# Patient Record
Sex: FEMALE | Race: WHITE
[De-identification: names, ages, dates, MRNs, and addresses within clinical notes are randomized per-mention and may not be internally consistent; named-entity substitution may affect disease eponyms.]

---

## 2018-10-29 ENCOUNTER — HOSPITAL ENCOUNTER (EMERGENCY)
Dept: HOSPITAL 97 - ER | Age: 63
Discharge: HOME | End: 2018-10-29
Payer: COMMERCIAL

## 2018-10-29 DIAGNOSIS — K21.0: Primary | ICD-10-CM

## 2018-10-29 DIAGNOSIS — J15.8: ICD-10-CM

## 2018-10-29 DIAGNOSIS — K30: ICD-10-CM

## 2018-10-29 DIAGNOSIS — E03.9: ICD-10-CM

## 2018-10-29 LAB
ALBUMIN SERPL BCP-MCNC: 3.7 G/DL (ref 3.4–5)
ALP SERPL-CCNC: 73 U/L (ref 45–117)
ALT SERPL W P-5'-P-CCNC: 36 U/L (ref 12–78)
AST SERPL W P-5'-P-CCNC: 26 U/L (ref 15–37)
BUN BLD-MCNC: 13 MG/DL (ref 7–18)
GLUCOSE SERPLBLD-MCNC: 111 MG/DL (ref 74–106)
HCT VFR BLD CALC: 37.3 % (ref 36–45)
INR BLD: 0.97
LIPASE SERPL-CCNC: 104 U/L (ref 73–393)
LYMPHOCYTES # SPEC AUTO: 1.1 K/UL (ref 0.7–4.9)
MAGNESIUM SERPL-MCNC: 2.4 MG/DL (ref 1.8–2.4)
MCH RBC QN AUTO: 32.5 PG (ref 27–35)
MCV RBC: 95.9 FL (ref 80–100)
NT-PROBNP SERPL-MCNC: 118 PG/ML (ref ?–125)
PMV BLD: 8.2 FL (ref 7.6–11.3)
POTASSIUM SERPL-SCNC: 4 MMOL/L (ref 3.5–5.1)
RBC # BLD: 3.89 M/UL (ref 3.86–4.86)
TROPONIN (EMERG DEPT USE ONLY): < 0.02 NG/ML (ref 0–0.04)
TSH SERPL DL<=0.05 MIU/L-ACNC: 0.04 UIU/ML (ref 0.36–3.74)

## 2018-10-29 PROCEDURE — 80048 BASIC METABOLIC PNL TOTAL CA: CPT

## 2018-10-29 PROCEDURE — 71275 CT ANGIOGRAPHY CHEST: CPT

## 2018-10-29 PROCEDURE — 71045 X-RAY EXAM CHEST 1 VIEW: CPT

## 2018-10-29 PROCEDURE — 83735 ASSAY OF MAGNESIUM: CPT

## 2018-10-29 PROCEDURE — 81003 URINALYSIS AUTO W/O SCOPE: CPT

## 2018-10-29 PROCEDURE — 87088 URINE BACTERIA CULTURE: CPT

## 2018-10-29 PROCEDURE — 80076 HEPATIC FUNCTION PANEL: CPT

## 2018-10-29 PROCEDURE — 96361 HYDRATE IV INFUSION ADD-ON: CPT

## 2018-10-29 PROCEDURE — 99285 EMERGENCY DEPT VISIT HI MDM: CPT

## 2018-10-29 PROCEDURE — 84443 ASSAY THYROID STIM HORMONE: CPT

## 2018-10-29 PROCEDURE — 83690 ASSAY OF LIPASE: CPT

## 2018-10-29 PROCEDURE — 84484 ASSAY OF TROPONIN QUANT: CPT

## 2018-10-29 PROCEDURE — 87086 URINE CULTURE/COLONY COUNT: CPT

## 2018-10-29 PROCEDURE — 85379 FIBRIN DEGRADATION QUANT: CPT

## 2018-10-29 PROCEDURE — 36415 COLL VENOUS BLD VENIPUNCTURE: CPT

## 2018-10-29 PROCEDURE — 96365 THER/PROPH/DIAG IV INF INIT: CPT

## 2018-10-29 PROCEDURE — 85025 COMPLETE CBC W/AUTO DIFF WBC: CPT

## 2018-10-29 PROCEDURE — 85610 PROTHROMBIN TIME: CPT

## 2018-10-29 PROCEDURE — 87040 BLOOD CULTURE FOR BACTERIA: CPT

## 2018-10-29 PROCEDURE — 83880 ASSAY OF NATRIURETIC PEPTIDE: CPT

## 2018-10-29 PROCEDURE — 93005 ELECTROCARDIOGRAM TRACING: CPT

## 2018-10-29 PROCEDURE — 96375 TX/PRO/DX INJ NEW DRUG ADDON: CPT

## 2018-10-29 NOTE — RAD REPORT
EXAM DESCRIPTION:  CT - Chest For Pe Angio - 10/29/2018 4:31 pm

 

CLINICAL HISTORY:  Chest pain.

CHEST PAIN

 

COMPARISON:  No comparisons

 

TECHNIQUE:  CT angiogram of the pulmonary arteries was performed with MIP.

 

All CT scans are performed using dose optimization technique as appropriate and may include automated
 exposure control or mA/KV adjustment according to patient size.

 

FINDINGS:  No evidence of pulmonary thromboembolism.

 

No acute aortic finding demonstrated.

 

Mild ground-glass opacities are present bilaterally.

 

No significant pericardial or pleural fluid.

 

No concerning bony finding. The esophagus appears thickened particularly in the mid and lower esophag
us.

 

IMPRESSION:  No evidence of pulmonary thromboembolism.

 

Diffuse esophageal thickening is present particularly in the mid in the lower esophagus may indicate 
esophagitis. Consider upper endoscopy followup assessment.

 

Bilateral mild nonspecific ground-glass opacities may be related to alveolitis or interstitial edema.

## 2018-10-29 NOTE — EKG
Test Date:    2018-10-29               Test Time:    14:46:13

Technician:   NESHA                                     

                                                     

MEASUREMENT RESULTS:                                       

Intervals:                                           

Rate:         73                                     

IN:           160                                    

QRSD:         90                                     

QT:           382                                    

QTc:          420                                    

Axis:                                                

P:            72                                     

IN:           160                                    

QRS:          59                                     

T:            47                                     

                                                     

INTERPRETIVE STATEMENTS:                                       

                                                     

Normal sinus rhythm

Normal ECG

No previous ECG available for comparison



Electronically Signed On 10-29-18 15:43:18 CDT by Alex Wing

## 2018-10-29 NOTE — RAD REPORT
EXAM DESCRIPTION:  RAD - Chest Single View - 10/29/2018 3:02 pm

 

CLINICAL HISTORY:  Chest pain

 

COMPARISON:  None.

 

TECHNIQUE:  AP portable chest image was obtained 1456 hours .

 

FINDINGS:  Lungs are clear. Heart and vasculature are normal. No measurable pleural effusion and no p
neumothorax. No acute bony abnormality seen. No acute aortic findings suspected.

 

IMPRESSION:  No acute cardiopulmonary process.

## 2018-10-29 NOTE — ER
Nurse's Notes                                                                                     

 [facility]                                                                

Name: Rudy Corea                                                                                 

Age: 63 yrs                                                                                       

Sex: Female                                                                                       

: 1955                                                                                   

MRN: [id number]                                                                                   

Arrival Date: 10/29/2018                                                                          

Time: 14:06                                                                                       

Account#: [id number]                                                                            

Bed 16                                                                                            

Private MD: None, None                                                                            

Diagnosis: Functional dyspepsia;Chest pain, unspecified;Gastro-esophageal reflux disease with     

  esophagitis;Pneumonia due to other specified bacteria                                           

                                                                                                  

Presentation:                                                                                     

10/29                                                                                             

14:12 Presenting complaint: Patient states: "I've had like this heart burn for the past 4-5   ss  

      days and increased belching. The only way I can explain it is like if you take some         

      pills and they get stuck. It hurts to swallow. It's not a heart attack.". Transition of     

      care: patient was not received from another setting of care. Onset of symptoms was          

      2018. Risk Assessment: Do you want to hurt yourself or someone else?            

      Patient reports no desire to harm self or others. Initial Sepsis Screen: Does the           

      patient meet any 2 criteria? No. Patient's initial sepsis screen is negative. Does the      

      patient have a suspected source of infection? No. Patient's initial sepsis screen is        

      negative. Care prior to arrival: None.                                                      

14:12 Method Of Arrival: Ambulatory                                                           ss  

14:12 Acuity: LAYLA 3                                                                           ss  

                                                                                                  

Triage Assessment:                                                                                

15:42 General: Appears in no apparent distress. comfortable, well groomed, well nourished,    ls4 

      Behavior is calm, cooperative. Pain: Complains of pain in xyphoid area and mid-sternal      

      area Pain currently is 5 out of 10 on a pain scale. Quality of pain is described as pt      

      describes it as a "feeling that something is stuck in my throat". Neuro: No deficits        

      noted. Cardiovascular: No deficits noted. Rhythm is regular. Respiratory: No deficits       

      noted.                                                                                      

                                                                                                  

Historical:                                                                                       

- Allergies:                                                                                      

14:15 No Known Allergies;                                                                     ss  

- Home Meds:                                                                                      

14:15 Effexor Oral 75 mg daily [Active]; [place] Thyroid Oral [Active]; hormone replacement    ss  

      [Active];                                                                                   

- PMHx:                                                                                           

14:15 Hypothyroidism;                                                                         ss  

                                                                                                  

- Immunization history:: Adult Immunizations unknown.                                             

- Social history:: Smoking status: Patient/guardian denies using tobacco.                         

- Ebola Screening: : Patient denies exposure to infectious person Patient denies travel           

  to an Ebola-affected area in the 21 days before illness onset.                                  

- Family history:: not pertinent.                                                                 

                                                                                                  

                                                                                                  

Screening:                                                                                        

15:39 Abuse screen: Denies threats or abuse. Denies injuries from another. Nutritional        ls4 

      screening: No deficits noted. Tuberculosis screening: No symptoms or risk factors           

      identified. Fall Risk None identified.                                                      

                                                                                                  

Assessment:                                                                                       

15:00 General: Appears in no apparent distress. Behavior is calm, cooperative. Pain: Pain     hb  

      currently is 0 out of 10 on a pain scale. at worst was 5 out of 10 on a pain scale.         

      Neuro: Level of Consciousness is awake, alert, obeys commands, Oriented to person,          

      place, time, situation. Cardiovascular: Capillary refill < 3 seconds Patient's skin is      

      warm and dry. Respiratory: Airway is patent Trachea midline Respiratory effort is even,     

      unlabored, Respiratory pattern is regular, symmetrical, Breath sounds are clear             

      bilaterally. GI: No signs and/or symptoms were reported involving the gastrointestinal      

      system. : No signs and/or symptoms were reported regarding the genitourinary system.      

      EENT: No signs and/or symptoms were reported regarding the EENT system. Derm: Skin is       

      intact, is healthy with good turgor, Skin is pink, warm \T\ dry.                            

15:44 Pain: Pain does not radiate. Pain began 2-3 days ago.                                   ls4 

16:15 Reassessment: Patient appears in no apparent distress at this time. No changes from     hb  

      previously documented assessment. Patient and/or family updated on plan of care and         

      expected duration. Pain level reassessed. Patient is alert, oriented x 3, equal             

      unlabored respirations, skin warm/dry/pink.                                                 

16:32 General: Appears in no apparent distress. well developed, Behavior is calm,             ls4 

      cooperative. Neuro: No deficits noted. Cardiovascular: No deficits noted. Respiratory:      

      No deficits noted. GI: GI: Reports feeling that something is stuck in her throat.           

      Musculoskeletal: No deficits noted. No signs and/or symptoms reported regarding the         

      musculoskeletal system.                                                                     

17:30 Reassessment: Patient appears in no apparent distress at this time. No changes from     hb  

      previously documented assessment. Patient and/or family updated on plan of care and         

      expected duration. Pain level reassessed. Patient is alert, oriented x 3, equal             

      unlabored respirations, skin warm/dry/pink.                                                 

                                                                                                  

Vital Signs:                                                                                      

14:15  / 94; Pulse 84; Resp 16; Temp 97.4(TE); Pulse Ox 98% on R/A; Weight 68.04 kg;    ss  

      Height 5 ft. 9 in. (175.26 cm); Pain 0/10;                                                  

16:34  / 73; Pulse 78; Resp 16; Pulse Ox 99% on R/A; Pain 0/10;                         ls4 

18:18  / 74; Pulse 81; Resp 16; Temp 98.4; Pulse Ox 99% ; Pain 0/10;                    ls4 

14:15 Body Mass Index 22.15 (68.04 kg, 175.26 cm)                                               

                                                                                                  

ED Course:                                                                                        

14:06 Patient arrived in ED.                                                                  sb2 

14:07 None, None is Private Physician.                                                        sb2 

14:13 Triage completed.                                                                       ss  

14:15 Arm band placed on right wrist.                                                         ss  

14:35 Patient has correct armband on for positive identification. Bed in low position. Call   ls4 

      light in reach. Side rails up X2. Cardiac monitor on. Pulse ox on. NIBP on.                 

14:35 Noise minimized. Lights dimmed. Warm blanket given. Verbal reassurance given.           ls4 

14:35 No provider procedures requiring assistance completed. Initial lab(s) drawn, by me,     ls4 

      sent to lab. Inserted saline lock: 20 gauge in left antecubital area, using aseptic         

      technique. Blood collected. Patient maintains SpO2 saturation greater than 95% on room      

      air.                                                                                        

14:36 Henok Merino MD is Attending Physician.                                             lay 

14:57 X-ray completed. Portable x-ray completed in exam room. Patient tolerated procedure     ls3 

      well.                                                                                       

15:01 EKG done, by EKG tech. reviewed by Henok Merino MD.                                   at1 

15:02 XRAY Chest (1 view) In Process Unspecified.                                             EDMS

16:03 Tatiana Romero, RN is Primary Nurse.                                                   hb  

16:11 Radiology exam delayed due to lab results not completed at this time. (BUN/Creatinine). cw1 

16:16 Patient moved to CT.                                                                    cw1 

16:31 CT Chest For PE Angio In Process Unspecified.                                           EDMS

17:08 Basic Metabolic Panel Sent.                                                             hb  

17:08 CBC with Diff Sent.                                                                     hb  

17:08 LFT's Sent.                                                                             hb  

17:33 Jonn Hendricks MD is Referral Physician.                                               aly 

17:33 Jack Murillo MD is Referral Physician.                                                  aly 

18:00 Second set of blood cultures drawn by me, by venipuncture 23G to right ac.              dh3 

18:17 IV discontinued, intact, bleeding controlled, No redness/swelling at site. Pressure     ls4 

      dressing applied.                                                                           

                                                                                                  

Administered Medications:                                                                         

15:20 Drug: NS 0.9% 1000 ml Route: IV; Rate: 125 ml/hr; Site: left antecubital;               ls4 

18:21 Follow up: IV Status: Completed infusion; IV Intake: 375ml                              ls4 

15:30 Drug: Aspirin Chewable Tablet 162 mg Route: PO;                                         ls4 

16:12 Follow up: Response: No adverse reaction                                                ls4 

16:00 Drug: ProTONIX 40 mg Route: IVP; Site: left antecubital;                                ls4 

16:44 Follow up: Response: No adverse reaction; No change in condition                        ls4 

17:58 Drug: Zithromax 500 mg Route: PO;                                                       ls4 

18:16 Follow up: Response: No adverse reaction                                                ls4 

17:59 Not Given (Duplicate Order): Rocephin - (cefTRIAXone) 1 grams IVPB once over 30 mins;   ls4 

      (mix in 50 mL NS)                                                                           

18:01 Drug: Rocephin 1 grams Route: IV; Rate: calculated rate; Site: left antecubital;        ls4 

18:15 Follow up: Response: No adverse reaction                                                ls4 

18:20 Follow up: IV Status: Completed infusion; IV Intake: 3ml                                ls4 

                                                                                                  

                                                                                                  

Intake:                                                                                           

18:20 IV: 3ml; Total: 3ml.                                                                    ls4 

18:21 IV: 375ml; Total: 378ml.                                                                ls4 

                                                                                                  

Outcome:                                                                                          

14:35 Condition: stable                                                                       ls4 

17:34 Discharge ordered by MD.                                                                aly 

18:17 Discharged to home ambulatory.                                                          ls4 

18:17 Discharge instructions given to patient, Instructed on discharge instructions, follow       

      up and referral plans. no drinking with medication, no driving heavy equipment,             

      medication usage, Demonstrated understanding of instructions, follow-up care,               

      medications, Prescriptions given X 2.                                                       

18:33 Patient left the ED.                                                                      

                                                                                                  

Signatures:                                                                                       

Dispatcher [employer]                           EDHenok Roldan MD MD cha Smirch, Shelby, RN RN ss Woodley, Crystal                             cw1                                                  

Nancy Pope, EKG Tech              EKG Tat1                                                  

Tatiana Romero RN                     RN                                                      

Whitley Fonseca                              dh3                                                  

Jane Rincon                               sb2                                                  

Praveen Vidal                                ls3                                                  

Tierra Ledezma RN                       RN   ls4                                                  

                                                                                                  

Corrections: (The following items were deleted from the chart)                                    

18:20 18:04 IV Intake: 3ml ls4                                                                ls4 

                                                                                                  

**************************************************************************************************

## 2018-10-29 NOTE — EDPHYS
Physician Documentation                                                                           

 [facility]                                                                

Name: Rudy Corea                                                                                 

Age: 63 yrs                                                                                       

Sex: Female                                                                                       

: 1955                                                                                   

MRN: [id number]                                                                                   

Arrival Date: 10/29/2018                                                                          

Time: 14:06                                                                                       

Account#: [id number]                                                                            

Bed 16                                                                                            

Private MD: None, None                                                                            

ED Physician Henok Merino                                                                      

HPI:                                                                                              

10/29                                                                                             

15:35 This 63 yrs old  Female presents to ER via Ambulatory with complaints of Chest aly 

      Pressure.                                                                                   

15:35 The patient or guardian reports chest pain that is located primarily in the anterior    aly 

      chest wall. Onset: 4 day(s) ago. The pain does not radiate. Associated signs and            

      symptoms: Pertinent positives: dysphagia. The chest pain is described as dysphagia.         

      Severity of pain: At its worst the pain was mild in the emergency department the pain       

      is unchanged. The patient has not experienced similar symptoms in the past.                 

                                                                                                  

Historical:                                                                                       

- Allergies:                                                                                      

14:15 No Known Allergies;                                                                     ss  

- Home Meds:                                                                                      

14:15 Effexor Oral 75 mg daily [Active]; [place] Thyroid Oral [Active]; hormone replacement    ss  

      [Active];                                                                                   

- PMHx:                                                                                           

14:15 Hypothyroidism;                                                                         ss  

                                                                                                  

- Immunization history:: Adult Immunizations unknown.                                             

- Social history:: Smoking status: Patient/guardian denies using tobacco.                         

- Ebola Screening: : Patient denies exposure to infectious person Patient denies travel           

  to an Ebola-affected area in the 21 days before illness onset.                                  

- Family history:: not pertinent.                                                                 

                                                                                                  

                                                                                                  

ROS:                                                                                              

15:35 Constitutional: Negative for fever, chills, and weight loss, Eyes: Negative for injury, aly 

      pain, redness, and discharge, ENT: Negative for injury, pain, and discharge, Neck:          

      Negative for injury, pain, and swelling, Cardiovascular: Negative for chest pain,           

      palpitations, and edema, Respiratory: Negative for shortness of breath, cough,              

      wheezing, and pleuritic chest pain, Back: Negative for injury and pain, : Negative        

      for injury, bleeding, discharge, and swelling, MS/Extremity: Negative for injury and        

      deformity, Skin: Negative for injury, rash, and discoloration, Neuro: Negative for          

      headache, weakness, numbness, tingling, and seizure.                                        

15:35 Cardiovascular: Positive for chest pain, of the .                                           

15:35 Abdomen/GI: Positive for                                                                    

                                                                                                  

Exam:                                                                                             

15:35 Constitutional:  This is a well developed, well nourished patient who is awake, alert,  aly 

      and in no acute distress. Head/Face:  Normocephalic, atraumatic. Eyes:  Pupils equal        

      round and reactive to light, extra-ocular motions intact.  Lids and lashes normal.          

      Conjunctiva and sclera are non-icteric and not injected.  Cornea within normal limits.      

      Periorbital areas with no swelling, redness, or edema. ENT:  Nares patent. No nasal         

      discharge, no septal abnormalities noted.  Tympanic membranes are normal and external       

      auditory canals are clear.  Oropharynx with no redness, swelling, or masses, exudates,      

      or evidence of obstruction, uvula midline.  Mucous membranes moist. Neck:  Trachea          

      midline, no thyromegaly or masses palpated, and no cervical lymphadenopathy.  Supple,       

      full range of motion without nuchal rigidity, or vertebral point tenderness.  No            

      Meningismus. Chest/axilla:  Normal chest wall appearance and motion.  Nontender with no     

      deformity.  No lesions are appreciated. Cardiovascular:  Regular rate and rhythm with a     

      normal S1 and S2.  No gallops, murmurs, or rubs.  Normal PMI, no JVD.  No pulse             

      deficits. Respiratory:  Lungs have equal breath sounds bilaterally, clear to                

      auscultation and percussion.  No rales, rhonchi or wheezes noted.  No increased work of     

      breathing, no retractions or nasal flaring. Abdomen/GI:  Soft, non-tender, with normal      

      bowel sounds.  No distension or tympany.  No guarding or rebound.  No evidence of           

      tenderness throughout. Back:  No spinal tenderness.  No costovertebral tenderness.          

      Full range of motion. Female :  Normal external genitalia. Skin:  Warm, dry with          

      normal turgor.  Normal color with no rashes, no lesions, and no evidence of cellulitis.     

      MS/ Extremity:  Pulses equal, no cyanosis.  Neurovascular intact.  Full, normal range       

      of motion. Neuro:  Awake and alert, GCS 15, oriented to person, place, time, and            

      situation.  Cranial nerves II-XII grossly intact.  Motor strength 5/5 in all                

      extremities.  Sensory grossly intact.  Cerebellar exam normal.  Normal gait. Psych:         

      Awake, alert, with orientation to person, place and time.  Behavior, mood, and affect       

      are within normal limits.                                                                   

15:35 Musculoskeletal/extremity: DVT Exam: No signs of deep vein thrombosis. no pain, no          

      swelling, no tenderness, negative Homans' sign noted on exam, no appreciated bluish         

      discoloration, no erythema, no increased warmth.                                            

                                                                                                  

Vital Signs:                                                                                      

14:15  / 94; Pulse 84; Resp 16; Temp 97.4(TE); Pulse Ox 98% on R/A; Weight 68.04 kg;    ss  

      Height 5 ft. 9 in. (175.26 cm); Pain 0/10;                                                  

16:34  / 73; Pulse 78; Resp 16; Pulse Ox 99% on R/A; Pain 0/10;                         ls4 

18:18  / 74; Pulse 81; Resp 16; Temp 98.4; Pulse Ox 99% ; Pain 0/10;                    ls4 

14:15 Body Mass Index 22.15 (68.04 kg, 175.26 cm)                                             ss  

                                                                                                  

MDM:                                                                                              

14:36 Patient medically screened.                                                             [facility] 

15:38 Data reviewed: vital signs, nurses notes, lab test result(s), EKG, radiologic studies.  [facility] 

                                                                                                  

10/29                                                                                             

14:39 Order name: Basic Metabolic Panel                                                       [facility] 

10/29                                                                                             

14:39 Order name: CBC with Diff                                                               [facility] 

10/29                                                                                             

14:39 Order name: LFT's                                                                       [facility] 

10/29                                                                                             

14:39 Order name: Magnesium; Complete Time: 17:30                                             [facility] 

10/29                                                                                             

14:39 Order name: NT PRO-BNP; Complete Time: 17:30                                            [facility] 

10/29                                                                                             

14:39 Order name: PT-INR; Complete Time: 17:30                                                [facility] 

10/29                                                                                             

14:39 Order name: Troponin (emerg Dept Use Only); Complete Time: 17:30                        [facility] 

10/29                                                                                             

14:39 Order name: Lipase; Complete Time: 17:30                                                [facility] 

10/29                                                                                             

14:39 Order name: Urine Culture                                                               [facility] 

10/29                                                                                             

14:40 Order name: Basic Metabolic Panel; Complete Time: 17:30                                 [facility]

10/29                                                                                             

14:40 Order name: CBC with Automated Diff; Complete Time: 17:30                               [facility]

10/29                                                                                             

14:40 Order name: Liver (Hepatic) Function; Complete Time: 17:30                              [facility]

10/29                                                                                             

14:40 Order name: D-Dimer; Complete Time: 17:30                                               [facility] 

10/29                                                                                             

14:40 Order name: TSH; Complete Time: 17:30                                                   [facility] 

10/29                                                                                             

14:18 Order name: EKG; Complete Time: 14:18                                                     

10/29                                                                                             

14:18 Order name: EKG - Nurse/Tech; Complete Time: 16:04                                        

10/29                                                                                             

14:39 Order name: XRAY Chest (1 view); Complete Time: 15:32                                   [facility] 

10/29                                                                                             

14:39 Order name: Cardiac monitoring; Complete Time: 16:04                                    [facility] 

10/29                                                                                             

14:39 Order name: IV Saline Lock; Complete Time: 16:03                                        [facility] 

10/29                                                                                             

14:39 Order name: Labs collected and sent; Complete Time: 16:04                               [facility] 

10/29                                                                                             

14:39 Order name: O2 Per Protocol; Complete Time: 16:04                                       [facility] 

10/29                                                                                             

15:32 Order name: CT Chest For PE Angio; Complete Time: 17:30                                 [facility] 

10/29                                                                                             

17:33 Order name: Blood Culture Adult (2)                                                     [facility] 

10/29                                                                                             

17:48 Order name: Urine Dipstick--Ancillary (enter results)                                     

10/29                                                                                             

14:39 Order name: O2 Sat Monitoring; Complete Time: 16:04                                     [facility] 

10/29                                                                                             

14:39 Order name: Urine Dipstick-Ancillary (obtain specimen); Complete Time: 18:06            [facility] 

                                                                                                  

Administered Medications:                                                                         

15:20 Drug: NS 0.9% 1000 ml Route: IV; Rate: 125 ml/hr; Site: left antecubital;               ls4 

18:21 Follow up: IV Status: Completed infusion; IV Intake: 375ml                              ls4 

15:30 Drug: Aspirin Chewable Tablet 162 mg Route: PO;                                         ls4 

16:12 Follow up: Response: No adverse reaction                                                ls4 

16:00 Drug: ProTONIX 40 mg Route: IVP; Site: left antecubital;                                ls4 

16:44 Follow up: Response: No adverse reaction; No change in condition                        ls4 

17:58 Drug: Zithromax 500 mg Route: PO;                                                       ls4 

18:16 Follow up: Response: No adverse reaction                                                ls4 

17:59 Not Given (Duplicate Order): Rocephin - (cefTRIAXone) 1 grams IVPB once over 30 mins;   ls4 

      (mix in 50 mL NS)                                                                           

18:01 Drug: Rocephin 1 grams Route: IV; Rate: calculated rate; Site: left antecubital;        ls4 

18:15 Follow up: Response: No adverse reaction                                                ls4 

18:20 Follow up: IV Status: Completed infusion; IV Intake: 3ml                                ls4 

                                                                                                  

                                                                                                  

Disposition:                                                                                      

10/29/18 17:34 Discharged to Home. Impression: Functional dyspepsia, Chest pain, unspecified,     

  Gastro-esophageal reflux disease with esophagitis, Pneumonia due to other                       

  specified bacteria.                                                                             

- Condition is Stable.                                                                            

- Discharge Instructions: Nonspecific Chest Pain, Dysphagia, Esophagitis,                         

  Gastroesophageal Reflux Disease, Adult, Community-Acquired Pneumonia, Adult,                    

  Nonspecific Chest Pain, Easy-to-Read, Gastroesophageal Reflux Disease, Adult,                   

  Easy-to-Read, Community-Acquired Pneumonia, Adult, Easy-to-Read, Aspirin and Your               

  Heart, Pneumonitis.                                                                             

- Prescriptions for Protonix 40 mg Oral Tablet, Delayed Release (E.C.) - take 1 tablet            

  by ORAL route every 12 hours; 40 tablet. Zithromax 500 mg Oral Tablet - take 1 tablet           

  by ORAL route once daily for 4 days; 4 tablet.                                                  

- Medication Reconciliation Form, Thank You Letter, Antibiotic Education, Prescription            

  Opioid Use form.                                                                                

- Follow up: Private Physician; When: 2 - 3 days; Reason: Recheck today's complaints,             

  Continuance of care, Re-evaluation by your physician. Follow up: Jonn Hendricks; When:           

  2 - 3 days; Reason: Recheck today's complaints, Continuance of care, Re-evaluation by           

  your physician. Follow up: Jack Murillo; When: 2 - 3 days; Reason: Recheck today's                

  complaints, Continuance of care, Re-evaluation by your physician.                               

- Problem is new.                                                                                 

- Symptoms have improved.                                                                         

                                                                                                  

                                                                                                  

                                                                                                  

Signatures:                                                                                       

Dispatcher [employer]                           EDMS                                                 

Henok Merino MD MD cha Smirch, Shelby, YOGESH                      RN                                                      

Tatiana Romero, YOGESH                     RN   Tierra Prakash RN                       RN   ls4                                                  

                                                                                                  

Corrections: (The following items were deleted from the chart)                                    

18:22 17:34 10/29/2018 17:34 Discharged to Home. Impression: Functional dyspepsia; Chest      ls4 

      pain, unspecified; Gastro-esophageal reflux disease with esophagitis; Pneumonia due to      

      other specified bacteria. Condition is Stable. Discharge Instructions: Nonspecific          

      Chest Pain, Dysphagia, Nonspecific Chest Pain, Easy-to-Read, Aspirin and Your Heart.        

      Prescriptions for Protonix 40 mg Oral Tablet - take 1 tablet by ORAL route once daily;      

      30 tablet. and Forms are Medication Reconciliation Form, Thank You Letter, Antibiotic       

      Education, Prescription Opioid Use. Follow up: Private Physician; When: 2 - 3 days;         

      Reason: Recheck today's complaints, Continuance of care, Re-evaluation by your              

      physician. Follow up: Jonn Hendricks; When: 2 - 3 days; Reason: Recheck today's              

      complaints, Continuance of care, Re-evaluation by your physician. Follow up: Jack Murillo; When: 2 - 3 days; Reason: Recheck today's complaints, Continuance of care,            

      Re-evaluation by your physician. Problem is new. Symptoms have improved. [facility]                

18:33 18:22 10/29/2018 17:34 Discharged to Home. Impression: Functional dyspepsia; Chest      hb  

      pain, unspecified; Gastro-esophageal reflux disease with esophagitis; Pneumonia due to      

      other specified bacteria. Condition is Stable. Discharge Instructions: Nonspecific          

      Chest Pain, Dysphagia, Nonspecific Chest Pain, Easy-to-Read, Aspirin and Your Heart,        

      Esophagitis, Gastroesophageal Reflux Disease, Adult, Community-Acquired Pneumonia,          

      Adult, Gastroesophageal Reflux Disease, Adult, Easy-to-Read, Community-Acquired             

      Pneumonia, Adult, Easy-to-Read, Pneumonitis. Prescriptions for Protonix 40 mg Oral          

      Tablet, Delayed Release (E.C.) - take 1 tablet by ORAL route every 12 hours; 40 tablet,     

      Zithromax 500 mg Oral Tablet - take 1 tablet by ORAL route once daily for 4 days; 4         

      tablet. and Forms are Medication Reconciliation Form, Thank You Letter, Antibiotic          

      Education, Prescription Opioid Use. Follow up: Private Physician; When: 2 - 3 days;         

      Reason: Recheck today's complaints, Continuance of care, Re-evaluation by your              

      physician. Follow up: Jonn Hendricks; When: 2 - 3 days; Reason: Recheck today's              

      complaints, Continuance of care, Re-evaluation by your physician. Follow up: Jack Murillo; When: 2 - 3 days; Reason: Recheck today's complaints, Continuance of care,            

      Re-evaluation by your physician. Problem is new. Symptoms have improved. ls4                

                                                                                                  

**************************************************************************************************